# Patient Record
Sex: MALE
[De-identification: names, ages, dates, MRNs, and addresses within clinical notes are randomized per-mention and may not be internally consistent; named-entity substitution may affect disease eponyms.]

---

## 2018-07-15 ENCOUNTER — HOSPITAL ENCOUNTER (EMERGENCY)
Dept: HOSPITAL 97 - ER | Age: 58
Discharge: ANOTHER HOSPITAL | End: 2018-07-15
Payer: COMMERCIAL

## 2018-07-15 DIAGNOSIS — Z93.2: ICD-10-CM

## 2018-07-15 DIAGNOSIS — T81.31XA: ICD-10-CM

## 2018-07-15 DIAGNOSIS — E11.9: ICD-10-CM

## 2018-07-15 DIAGNOSIS — Z79.4: ICD-10-CM

## 2018-07-15 DIAGNOSIS — Z85.038: ICD-10-CM

## 2018-07-15 DIAGNOSIS — I10: ICD-10-CM

## 2018-07-15 DIAGNOSIS — L02.211: Primary | ICD-10-CM

## 2018-07-15 LAB
ALBUMIN SERPL BCP-MCNC: 1.9 G/DL (ref 3.4–5)
ALP SERPL-CCNC: 343 U/L (ref 45–117)
ALT SERPL W P-5'-P-CCNC: 21 U/L (ref 12–78)
AST SERPL W P-5'-P-CCNC: 14 U/L (ref 15–37)
BUN BLD-MCNC: 7 MG/DL (ref 7–18)
GLUCOSE SERPLBLD-MCNC: 86 MG/DL (ref 74–106)
HCT VFR BLD CALC: 33.1 % (ref 39.6–49)
INR BLD: 1.4
LIPASE SERPL-CCNC: 76 U/L (ref 73–393)
LYMPHOCYTES # SPEC AUTO: 0.8 K/UL (ref 0.7–4.9)
MCH RBC QN AUTO: 24.3 PG (ref 27–35)
MCV RBC: 72.2 FL (ref 80–100)
PMV BLD: 8.3 FL (ref 7.6–11.3)
POTASSIUM SERPL-SCNC: 3.9 MMOL/L (ref 3.5–5.1)
RBC # BLD: 4.59 M/UL (ref 4.33–5.43)
UA COMPLETE W REFLEX CULTURE PNL UR: (no result)

## 2018-07-15 PROCEDURE — 84145 PROCALCITONIN (PCT): CPT

## 2018-07-15 PROCEDURE — 96361 HYDRATE IV INFUSION ADD-ON: CPT

## 2018-07-15 PROCEDURE — 87205 SMEAR GRAM STAIN: CPT

## 2018-07-15 PROCEDURE — 87077 CULTURE AEROBIC IDENTIFY: CPT

## 2018-07-15 PROCEDURE — 74177 CT ABD & PELVIS W/CONTRAST: CPT

## 2018-07-15 PROCEDURE — 36415 COLL VENOUS BLD VENIPUNCTURE: CPT

## 2018-07-15 PROCEDURE — 85730 THROMBOPLASTIN TIME PARTIAL: CPT

## 2018-07-15 PROCEDURE — 85025 COMPLETE CBC W/AUTO DIFF WBC: CPT

## 2018-07-15 PROCEDURE — 87186 SC STD MICRODIL/AGAR DIL: CPT

## 2018-07-15 PROCEDURE — 80076 HEPATIC FUNCTION PANEL: CPT

## 2018-07-15 PROCEDURE — 85610 PROTHROMBIN TIME: CPT

## 2018-07-15 PROCEDURE — 87070 CULTURE OTHR SPECIMN AEROBIC: CPT

## 2018-07-15 PROCEDURE — 96365 THER/PROPH/DIAG IV INF INIT: CPT

## 2018-07-15 PROCEDURE — 82962 GLUCOSE BLOOD TEST: CPT

## 2018-07-15 PROCEDURE — 81015 MICROSCOPIC EXAM OF URINE: CPT

## 2018-07-15 PROCEDURE — 81003 URINALYSIS AUTO W/O SCOPE: CPT

## 2018-07-15 PROCEDURE — 83690 ASSAY OF LIPASE: CPT

## 2018-07-15 PROCEDURE — 99285 EMERGENCY DEPT VISIT HI MDM: CPT

## 2018-07-15 PROCEDURE — 83605 ASSAY OF LACTIC ACID: CPT

## 2018-07-15 PROCEDURE — 87040 BLOOD CULTURE FOR BACTERIA: CPT

## 2018-07-15 PROCEDURE — 80048 BASIC METABOLIC PNL TOTAL CA: CPT

## 2018-07-15 NOTE — ER
Nurse's Notes                                                                                     

 [facility]                                                                

Name: Enmanuel Wiseman                                                                   

Age: 57 yrs                                                                                       

Sex: Male                                                                                         

: 1960                                                                                   

MRN: [id number]                                                                                   

Arrival Date: 07/15/2018                                                                          

Time: 17:29                                                                                       

Account#: [id number]                                                                            

Bed 28                                                                                            

Private MD: Vance Spence T                                                                        

Diagnosis: Intraabdominal abscess;Wound dehiscence                                                

                                                                                                  

Presentation:                                                                                     

07/15                                                                                             

17:47 Presenting complaint: Patient states: Patient had a colon resection and colostomy done  aj1 

      . Today his started to have bleeding from the incision site, which has also         

      started to open up. Patient reports redness around incision site. Bright red blood          

      noted to drsg to incision site. They spoke with Dr. Ferrer at [facility] who performed      

      the surgery and they were instructed to come to the emergency room. Transition of care:     

      patient was not received from another setting of care. Onset of symptoms was July 15,       

      2018. Risk Assessment: Do you want to hurt yourself or someone else? Patient reports no     

      desire to harm self or others. Initial Sepsis Screen: Does the patient meet any 2           

      criteria? HR > 90 bpm. No. Patient's initial sepsis screen is negative. Does the            

      patient have a suspected source of infection? Yes: Skin breakdown/wound. Care prior to      

      arrival: None.                                                                              

17:47 Method Of Arrival: Ambulatory                                                           aj1 

17:47 Acuity: MIGUEL 3                                                                           aj1 

                                                                                                  

Triage Assessment:                                                                                

17:55 General: Appears in no apparent distress. uncomfortable, Behavior is calm, cooperative, aj1 

      appropriate for age. Pain: Denies pain. Neuro: Level of Consciousness is awake, alert,      

      obeys commands, Oriented to person, place, time, situation, Denies dizziness.               

      Cardiovascular: Denies chest pain, Patient's skin is warm and dry. Respiratory: Airway      

      is patent Respiratory effort is even, unlabored, Respiratory pattern is regular,            

      symmetrical, Denies shortness of breath. GI: Reports nausea, Patient currently denies       

      diarrhea, vomiting. Derm: Skin is pale.                                                     

                                                                                                  

Historical:                                                                                       

- Allergies:                                                                                      

17:55 No Known Allergies;                                                                     aj1 

- Home Meds:                                                                                      

17:55 Zofran (as hydrochloride) 4 mg Oral tab 1 tabs as needed [Active]; tramadol 50 mg Oral  aj1 

      tab 1 tab every 6 hours [Active]; atorvastatin 40 mg oral tab 1 tab once daily              

      [Active]; carvedilol 3.125 mg oral tab 1 tab 2 times per day [Active]; Insulin Glargine     

      30 units Sub-Q twice a day [Active];                                                        

- PMHx:                                                                                           

17:55 Diabetes - NIDDM; High Cholesterol; Hypertension; colon cancer;                         aj1 

- PSHx:                                                                                           

17:55 colon resection; ileostomy;                                                             aj1 

                                                                                                  

- Immunization history:: Flu vaccine is not up to date.                                           

- Social history:: Smoking status: Patient/guardian denies using tobacco.                         

- Ebola Screening: : Patient denies travel to an Ebola-affected area in the 21 days               

  before illness onset.                                                                           

                                                                                                  

                                                                                                  

Screenin:45 Abuse screen: Denies threats or abuse. Nutritional screening: No deficits noted.        mb3 

      Tuberculosis screening: No symptoms or risk factors identified. Fall Risk No fall in        

      past 12 months (0 pts). Secondary diagnosis (15 points) IV access (20 points).              

      Ambulatory Aid- None/Bed Rest/Nurse Assist (0 pts). Gait- Impaired (20 pts.). Mental        

      Status- Oriented to own ability (0 pts). Total Guthrie Fall Scale indicates High Risk         

      Score (45 or more points). Fall prevention measures have been instituted. Side Rails Up     

      X 2 Placed Close to Nursing Station Frequent Obs/Assessments Occuring Family Present        

      and informed to notify staff if the need to leave the bedside As available patient and      

      family educated on Fall Prevention Program and Strategies.                                  

                                                                                                  

Assessment:                                                                                       

18:46 General: Appears distressed, ill, Behavior is calm, cooperative, appropriate for age.   mb3 

      Pain: Complains of pain in abdomen. Neuro: No deficits noted. Cardiovascular: No            

      deficits noted. Heart tones present Capillary refill < 3 seconds Patient's skin is warm     

      and dry. Respiratory: Airway is patent Respiratory effort is even, unlabored,               

      Respiratory pattern is regular, symmetrical. GI: Colostomy site is reddened. Ostomy         

      appliance is intact. surgical incision going down abdomen is inflamed and seeping           

      bloody purulent drainage. Reports lower abdominal pain. : No signs and/or symptoms        

      were reported regarding the genitourinary system. EENT: No signs and/or symptoms were       

      reported regarding the EENT system. Derm: Wound noted epigastric area and umbilical         

      area, incision from epigastic to umbilical area. colostomy at RLQ.                          

19:20 Reassessment: Patient and/or family updated on plan of care and expected duration. Pain mb3 

      level reassessed. Patient is alert, oriented x 3, equal unlabored respirations, skin        

      warm/dry/pink.                                                                              

20:24 Reassessment: Patient and/or family updated on plan of care and expected duration. Pain mb3 

      level reassessed. Patient is alert, oriented x 3, equal unlabored respirations, skin        

      warm/dry/pink.                                                                              

21:08 Reassessment: Patient and/or family updated on plan of care and expected duration. Pain mb3 

      level reassessed. Patient is alert, oriented x 3, equal unlabored respirations, skin        

      warm/dry/pink.                                                                              

22:24 Reassessment: No changes from previously documented assessment. Patient and/or family   mb3 

      updated on plan of care and expected duration. Pain level reassessed. Patient is alert,     

      oriented x 3, equal unlabored respirations, skin warm/dry/pink.                             

23:10 Reassessment: report called to Meet Mitchell RN, sbar used and all questions answered.   mb3 

                                                                                                  

Vital Signs:                                                                                      

17:55  / 75; Pulse 111; Resp 20; Temp 98.0(TE); Pulse Ox 95% on R/A; Weight 74.84 kg    aj1 

      (R); Height 5 ft. 6 in. (167.64 cm); Pain 0/10;                                             

19:18  / 83; Pulse 102; Resp 20; Pulse Ox 95% on R/A;                                   mb3 

20:23  / 71; Pulse 102; Resp 18; Pulse Ox 94% on R/A;                                   mb3 

20:39 Temp 98.9(O);                                                                           mb3 

21:07  / 69; Pulse 100; Resp 18; Pulse Ox 95% on R/A;                                   mb3 

22:23  / 65; Pulse 102; Resp 20; Pulse Ox 95% on R/A;                                   mb3 

17:55 Body Mass Index 26.63 (74.84 kg, 167.64 cm)                                             aj1 

                                                                                                  

ED Course:                                                                                        

17:29 Patient arrived in ED.                                                                  sb2 

17:29 Vance Spence MD is Private Physician.                                                   sb2 

17:52 Triage completed.                                                                       aj1 

17:55 Arm band placed on Patient placed in an exam room.                                      aj1 

18:04 Alphonso Parra, RN is Primary Nurse.                                                     mb3 

18:08 Cristobal Prieto NP is PHCP.                                                           pm1 

18:08 Siomara Vasquez MD is Attending Physician.                                           pm1 

18:38 Initial lab(s) drawn, by me, sent to lab.                                               cb2 

18:42 Inserted saline lock: 18 gauge in right antecubital area, using aseptic technique.      cb2 

      Blood collected.                                                                            

20:05 Urine collected: clean catch specimen.                                                  cb2 

20:10 CT completed. Patient moved to CT via stretcher. Patient moved back from CT.            cw1 

20:11 CT Abd/Pelvis - W/Contrast In Process Unspecified.                                      EDMS

23:10 Patient has correct armband on for positive identification.                             mb3 

23:10 No provider procedures requiring assistance completed. Patient transferred, IV remains  mb3 

      in place.                                                                                   

                                                                                                  

Administered Medications:                                                                         

18:44 Drug: NS 0.9% 1000 ml Route: IV; Rate: 1000 ml; Site: left antecubital;                 mb3 

22:07 Follow up: Response: No adverse reaction; IV Status: Completed infusion; IV Intake:     mb3 

      1000ml                                                                                      

21:30 Drug: NS 0.9% 1000 ml Route: IV; Rate: 100 ml/hr; Site: left antecubital;               rv  

23:09 Follow up: Response: No adverse reaction; IV Status: Infusion continued upon transfer;  mb3 

      IV Intake: 200ml                                                                            

22:05 Drug: InvANZ 1 grams Route: IVPB; Infused Over: 30 mins; Site: left antecubital;        mb3 

22:50 Follow up: Response: No adverse reaction; IV Status: Completed infusion; IV Intake:     mb3 

      100ml                                                                                       

                                                                                                  

                                                                                                  

Intake:                                                                                           

22:07 IV: 1000ml; Total: 1000ml.                                                              mb3 

22:50 IV: 100ml; Total: 1100ml.                                                               mb3 

23:09 IV: 200ml; Total: 1300ml.                                                               mb3 

                                                                                                  

Outcome:                                                                                          

21:00 ER care complete, transfer ordered by MD.                                               pm1 

23:09 Transferred to [facility], Transfer form completed.                         mb3 

23:09 Condition: stable                                                                           

23:09 Instructed on the need for transfer.                                                        

23:11 Patient left the ED.                                                                    mb3 

                                                                                                  

Addendum:                                                                                         

2018                                                                                        

     11:19 Addendum: Culture Results: Positive wound culture. Phone call Attempt #1 Faxed to Hannah sterling

           at [facility] [phone].                                                  

                                                                                                  

Signatures:                                                                                       

Dispatcher [employer]                           EDMS                                                 

Abigail Pineda RN                     RN   Raisa Dietrich                             cw1                                                  

Cristobal Prieto, NP                    NP   pm1                                                  

Nemo Burciaga RN                     RN   hb                                                   

oY De La Torre Sheri                               sb2                                                  

Alphonso Parra, RN                       RN   mb3                                                  

Nathan Mayer, RN                    RN   rv                                                   

                                                                                                  

**************************************************************************************************

## 2018-07-15 NOTE — EDPHYS
Physician Documentation                                                                           

 [facility]                                                                

Name: Enmanuel Wiseman                                                                   

Age: 57 yrs                                                                                       

Sex: Male                                                                                         

: 1960                                                                                   

MRN: [id number]                                                                                   

Arrival Date: 07/15/2018                                                                          

Time: 17:29                                                                                       

Account#: [id number]                                                                            

Bed 28                                                                                            

Private MD: Vance Spence T                                                                        

ED Physician Siomara Vasquez                                                                    

HPI:                                                                                              

07/15                                                                                             

18:30 This 57 yrs old  Male presents to ER via Ambulatory with complaints of Post     pm1 

      Surgical Bleeding.                                                                          

18:30 The patient presents with abdominal pain drainage and bleeding from surgical wound.     pm1 

      Redness on the lower aspect of vertical incision and upper area of ostomy . Onset: The      

      symptoms/episode began/occurred today. The symptoms do not radiate. Associated signs        

      and symptoms: Pertinent positives: fever, chills, Pertinent negatives: nausea,              

      vomiting, and diarrhea, chest pain, shortness of breath. The symptoms are described as      

      achy. Modifying factors: The symptoms are alleviated by Tramadol resolved abdominal         

      pain, the symptoms are aggravated by nothing. Severity of pain: in the emergency            

      department the pain is a 0 / 10. The patient has been recently seen by a physician:         

      Colectomy with ileostomy by Dr. Damon on  due to colon cancer.                         

                                                                                                  

Historical:                                                                                       

- Allergies:                                                                                      

17:55 No Known Allergies;                                                                     aj1 

- Home Meds:                                                                                      

17:55 Zofran (as hydrochloride) 4 mg Oral tab 1 tabs as needed [Active]; tramadol 50 mg Oral  aj1 

      tab 1 tab every 6 hours [Active]; atorvastatin 40 mg oral tab 1 tab once daily              

      [Active]; carvedilol 3.125 mg oral tab 1 tab 2 times per day [Active]; Insulin Glargine     

      30 units Sub-Q twice a day [Active];                                                        

- PMHx:                                                                                           

17:55 Diabetes - NIDDM; High Cholesterol; Hypertension; colon cancer;                         aj1 

- PSHx:                                                                                           

17:55 colon resection; ileostomy;                                                             aj1 

                                                                                                  

- Immunization history:: Flu vaccine is not up to date.                                           

- Social history:: Smoking status: Patient/guardian denies using tobacco.                         

- Ebola Screening: : Patient denies travel to an Ebola-affected area in the 21 days               

  before illness onset.                                                                           

                                                                                                  

                                                                                                  

ROS:                                                                                              

18:30 Eyes: Negative for injury, pain, redness, and discharge.                                pm1 

18:30 ENT: Negative for injury, pain, and discharge, Neck: Negative for injury, pain, and         

      swelling, Cardiovascular: Negative for chest pain, palpitations, and edema,                 

      Respiratory: Negative for shortness of breath, cough, wheezing, and pleuritic chest         

      pain.                                                                                       

18:30 Back: Negative for injury and pain, : Negative for injury, bleeding, discharge, and       

      swelling, MS/Extremity: Negative for injury and deformity.                                  

18:30 Neuro: Negative for headache, weakness, numbness, tingling, and seizure.                    

18:30 Constitutional: Positive for chills, fever.                                                 

18:30 Abdomen/GI: Positive for abdominal pain, Negative for nausea, vomiting, and diarrhea.       

18:30 Skin: Positive for of the abdomen, redness and drainage from vertical midline surgical      

      wound and redness around ileostomy.                                                         

                                                                                                  

Exam:                                                                                             

18:30 Constitutional:  This is a well developed, well nourished patient who is awake, alert,  pm1 

      and in no acute distress. Head/Face:  Normocephalic, atraumatic. Neck:  Trachea             

      midline, no thyromegaly or masses palpated, and no cervical lymphadenopathy.  Supple,       

      full range of motion without nuchal rigidity, or vertebral point tenderness.  No            

      Meningismus. Chest/axilla:  Normal chest wall appearance and motion.  Nontender with no     

      deformity.  No lesions are appreciated. Cardiovascular:  Regular rate and rhythm with a     

      normal S1 and S2.  No gallops, murmurs, or rubs.  No pulse deficits. Respiratory:           

      Lungs have equal breath sounds bilaterally, clear to auscultation and percussion.  No       

      rales, rhonchi or wheezes noted.  No increased work of breathing, no retractions or         

      nasal flaring.                                                                              

18:30 Back:  No spinal tenderness.  No costovertebral tenderness.  Full range of motion.          

      Skin:  Warm, dry with normal turgor.  Normal color with no rashes, no lesions, and no       

      evidence of cellulitis. MS/ Extremity:  Pulses equal, no cyanosis.  Neurovascular           

      intact.  Full, normal range of motion.                                                      

18:30 Abdomen/GI: Inspection: Right lower quadrant ostomy.  Prolapsed stoma 4 cm in length,       

      Bowel sounds: normal, Palpation: soft, mild abdominal tenderness, midline abdomen: area     

      of wound dehiscence .                                                                       

18:30 Neuro: Orientation: is normal, Motor: is normal, moves all fours.                           

                                                                                                  

Vital Signs:                                                                                      

17:55  / 75; Pulse 111; Resp 20; Temp 98.0(TE); Pulse Ox 95% on R/A; Weight 74.84 kg    aj1 

      (R); Height 5 ft. 6 in. (167.64 cm); Pain 0/10;                                             

19:18  / 83; Pulse 102; Resp 20; Pulse Ox 95% on R/A;                                   mb3 

20:23  / 71; Pulse 102; Resp 18; Pulse Ox 94% on R/A;                                   mb3 

20:39 Temp 98.9(O);                                                                           mb3 

21:07  / 69; Pulse 100; Resp 18; Pulse Ox 95% on R/A;                                   mb3 

22:23  / 65; Pulse 102; Resp 20; Pulse Ox 95% on R/A;                                   mb3 

17:55 Body Mass Index 26.63 (74.84 kg, 167.64 cm)                                             aj1 

                                                                                                  

MDM:                                                                                              

18:09 Patient medically screened.                                                             pm1 

20:57 Data reviewed: vital signs. Data interpreted: Pulse oximetry: on room air is 95 %.      pm1 

      Interpretation: normal. Counseling: I had a detailed discussion with the patient and/or     

      guardian regarding: the historical points, exam findings, and any diagnostic results        

      supporting the discharge/admit diagnosis, lab results, radiology results, the need to       

      transfer to another facility, for higher level of care.                                     

21:14 Physician consultation: Covering for Dr. Marisol Pineda was called at 21:05, was      pm1 

      contacted at 21:13, regarding regarding transfer, patient's condition, would like           

      medications started, Invanz 1 gm IV, Will contact Dr. Damon to determine where he would      

      like the patient transferred. Will call back.                                               

21:23 Physician consultation: Christiano Damon would like the patient transferred to    pm1 

      [facility] Admit the patient to himself or Dr. Damon. If admitted      

      to himself he will transfer care in the AM.                                                 

                                                                                                  

07/15                                                                                             

18:21 Order name: Basic Metabolic Panel; Complete Time: 19:45                                 pm1 

07/15                                                                                             

18:21 Order name: CBC with Diff; Complete Time: 19:45                                         pm1 

07/15                                                                                             

18:21 Order name: Creatinine for Radiology; Complete Time: 19:45                              pm1 

07/15                                                                                             

18:21 Order name: Hepatic Function; Complete Time: 19:45                                      pm1 

07/15                                                                                             

18:21 Order name: Lipase; Complete Time: 19:45                                                pm1 

07/15                                                                                             

18:21 Order name: Urine Microscopic Only; Complete Time: 20:37                                pm1 

07/15                                                                                             

18:21 Order name: Lactate; Complete Time: 19:17                                               pm1 

07/15                                                                                             

18:21 Order name: Procalcitonin; Complete Time: 20:18                                         pm1 

07/15                                                                                             

18:21 Order name: Blood Culture Adult (2)                                                     pm1 

07/15                                                                                             

18:22 Order name: PT-INR; Complete Time: 20:18                                                pm1 

07/15                                                                                             

18:22 Order name: Ptt, Activated; Complete Time: 20:18                                        pm1 

07/15                                                                                             

18:24 Order name: Wound Culture                                                               pm1 

07/15                                                                                             

20:06 Order name: Urine Dipstick--Ancillary (enter results)                                   ms  

07/15                                                                                             

20:06 Order name: Urine Dipstick-Ancillary; Complete Time: 20:18                              EDMS

07/15                                                                                             

18:21 Order name: IV Saline Lock; Complete Time: 18:43                                        pm1 

07/15                                                                                             

18:21 Order name: Labs collected and sent; Complete Time: 18:43                               pm1 

07/15                                                                                             

18:21 Order name: Urine Dipstick-Ancillary (obtain specimen); Complete Time: 20:47            pm1 

07/15                                                                                             

18:21 Order name: CT Abd/Pelvis - W/Contrast; Complete Time: 20:45                            pm1 

                                                                                                  

Administered Medications:                                                                         

18:44 Drug: NS 0.9% 1000 ml Route: IV; Rate: 1000 ml; Site: left antecubital;                 mb3 

22:07 Follow up: Response: No adverse reaction; IV Status: Completed infusion; IV Intake:     mb3 

      1000ml                                                                                      

21:30 Drug: NS 0.9% 1000 ml Route: IV; Rate: 100 ml/hr; Site: left antecubital;               rv  

23:09 Follow up: Response: No adverse reaction; IV Status: Infusion continued upon transfer;  mb3 

      IV Intake: 200ml                                                                            

22:05 Drug: InvANZ 1 grams Route: IVPB; Infused Over: 30 mins; Site: left antecubital;        mb3 

22:50 Follow up: Response: No adverse reaction; IV Status: Completed infusion; IV Intake:     mb3 

      100ml                                                                                       

                                                                                                  

                                                                                                  

Disposition:                                                                                      

                                                                                             

20:23 Co-signature as Attending Physician, Siomara Vasquez MD.                               ma2 

                                                                                                  

Disposition:                                                                                      

07/15/18 21:00 Transfer ordered to [facility]. Diagnosis are                  

  Intraabdominal abscess, Wound dehiscence.                                                       

- Reason for transfer: Higher level of care.                                                      

- Accepting physician is Dr. Pineda.                                                             

- Condition is Stable.                                                                            

- Problem is new.                                                                                 

- Symptoms have improved.                                                                         

                                                                                                  

                                                                                                  

                                                                                                  

Signatures:                                                                                       

Dispatcher [employer]                           EDAbigail Olmedo, RN                     RN   aj1                                                  

Cristobal Prieto, NP                    NP   pm1                                                  

Siomara Vasquez MD MD   ma2                                                  

Alphonso Parra, AMANDA                       RN   mb3                                                  

Nathan Mayer RN                    RN   rv                                                   

                                                                                                  

Corrections: (The following items were deleted from the chart)                                    

07/15                                                                                             

21:02 21:00 07/15/2018 21:00 Transfer ordered to [facility]. Diagnosis is pm1 

      Intraabdominal abscess. Reason for transfer: Higher level of care. Accepting physician      

      is Dr. Pineda. Condition is Stable. Problem is new. Symptoms have improved. pm1            

23:11 21:02 07/15/2018 21:00 Transfer ordered to [facility]. Diagnosis is mb3 

      Intraabdominal abscess; Wound dehiscence. Reason for transfer: Higher level of care.        

      Accepting physician is Dr. Pineda. Condition is Stable. Problem is new. Symptoms have      

      improved. pm1                                                                               

                                                                                                  

**************************************************************************************************

## 2018-07-15 NOTE — RAD REPORT
EXAM DESCRIPTION:  CT - Abdomen   Pelvis W Contrast - 7/15/2018 8:10 pm

 

CLINICAL HISTORY:   Abdominal pain.

 

COMPARISON:  None.

 

TECHNIQUE:  Computed axial tomography of the abdomen and pelvis was obtained. 100 cc Isovue-300 is ad
ministered intravenously. Oral contrast was given.

 

All CT scans are performed using dose optimization technique as appropriate and may include automated
 exposure control or mA/KV adjustment according to patient size.

 

FINDINGS:

Right lower lobe atelectasis is present

 

The liver, spleen, pancreas, left adrenal and kidneys appear unremarkable. A 10 millimeter right adre
nal nodule is seen.

 

Postsurgical changes of a right hemicolectomy are present. The wall of the hepatic flexure and what a
ppears to be the proximal right ascending colon is markedly thickened. Ill-defined fluid lies adjacen
t to colon within at the lateral right abdomen. An air bubble is noted. Minimal amount ascites is pre
sent within the pelvis

 

A right lower quadrant ileostomy is seen. Small bowel caliber is normal. Ill-defined fluid is present
 within the subcutaneous tissues of the right lateral abdomen and right flank.

 

The prostate gland is moderately to markedly enlarged. A prostatic nodule extrinsically compresses th
e posterior bladder.

 

Bilateral inguinal hernias contain fat. Air is present within the bladder.

 

Ill-defined fluid is present within the anterior subcutaneous fat of the abdomen and pelvis near midl
ine. An air bubble is noted.

 

The most inferior slice demonstrates a 17 millimeter lesion with a sclerotic border within the proxim
al left femur which is incompletely evaluated.

 

IMPRESSION:  Right zoe colectomy with right lower quadrant ileostomy.

 

Marked thickening of the wall of the hepatic flexure of the colon and what appears be proximal right 
ascending colon having the appearance of a colitis

 

Ill-defined fluid within the right abdomen could indicate infection or hematoma. A discrete abscess i
s not seen. This could be monitored on follow-up examination.

 

Air within the bladder presumably related to prior instrumentation. Infection can also result in this
 appearance

 

10 millimeter right adrenal nodule may represent an adenoma or metastasis. This could be further eval
uated with MRI